# Patient Record
Sex: MALE | Employment: FULL TIME | URBAN - METROPOLITAN AREA
[De-identification: names, ages, dates, MRNs, and addresses within clinical notes are randomized per-mention and may not be internally consistent; named-entity substitution may affect disease eponyms.]

---

## 2021-01-09 ENCOUNTER — OFFICE VISIT (OUTPATIENT)
Dept: URGENT CARE | Facility: CLINIC | Age: 21
End: 2021-01-09
Payer: COMMERCIAL

## 2021-01-09 VITALS
WEIGHT: 215 LBS | OXYGEN SATURATION: 100 % | BODY MASS INDEX: 28.49 KG/M2 | TEMPERATURE: 98 F | RESPIRATION RATE: 16 BRPM | HEART RATE: 64 BPM | HEIGHT: 73 IN

## 2021-01-09 DIAGNOSIS — R68.89 FLU-LIKE SYMPTOMS: ICD-10-CM

## 2021-01-09 DIAGNOSIS — U07.1 COVID-19: Primary | ICD-10-CM

## 2021-01-09 PROCEDURE — 99204 OFFICE O/P NEW MOD 45 MIN: CPT | Performed by: FAMILY MEDICINE

## 2021-01-09 PROCEDURE — U0003 INFECTIOUS AGENT DETECTION BY NUCLEIC ACID (DNA OR RNA); SEVERE ACUTE RESPIRATORY SYNDROME CORONAVIRUS 2 (SARS-COV-2) (CORONAVIRUS DISEASE [COVID-19]), AMPLIFIED PROBE TECHNIQUE, MAKING USE OF HIGH THROUGHPUT TECHNOLOGIES AS DESCRIBED BY CMS-2020-01-R: HCPCS | Performed by: FAMILY MEDICINE

## 2021-01-09 PROCEDURE — U0005 INFEC AGEN DETEC AMPLI PROBE: HCPCS | Performed by: FAMILY MEDICINE

## 2021-01-09 NOTE — LETTER
January 9, 2021     Patient: Emory Bruce   YOB: 2000   Date of Visit: 1/9/2021       To Whom it May Concern:    Emory Bruce is under my professional care  He was seen in my office on 1/9/2021  He may return to work on Pending his COVID test results  If you have any questions or concerns, please don't hesitate to call           Sincerely,          Joanna Aguayo DO        CC: No Recipients

## 2021-01-09 NOTE — PROGRESS NOTES
Assessment/Plan:   Recommend continued supportive care fluids and rest   Continue self quarantine as per CDC guidelines  Follow-up here with family physician if symptoms worsen  To call with COVID results  Diagnoses and all orders for this visit:    COVID-19    Flu-like symptoms  -     Novel Coronavirus (COVID-19), PCR [facility] - Office Collection          Subjective:     Patient ID: Emory Bruce is a 23 y o  male  Patient presents with:  COVID-19: loss of taste and smell x 5 days           Review of Systems   Constitutional: Negative  HENT: Negative  Eyes: Negative  Respiratory: Negative  Cardiovascular: Negative  Gastrointestinal: Negative  Endocrine: Negative  Genitourinary: Negative  Musculoskeletal: Negative  Skin: Negative  Allergic/Immunologic: Negative  Neurological: Negative  Hematological: Negative  Psychiatric/Behavioral: Negative  All other systems reviewed and are negative  Objective:     Physical Exam  Vitals signs and nursing note reviewed  Constitutional:       Appearance: He is well-developed  HENT:      Head: Normocephalic and atraumatic  Right Ear: External ear normal       Left Ear: External ear normal       Nose: Nose normal    Eyes:      Conjunctiva/sclera: Conjunctivae normal       Pupils: Pupils are equal, round, and reactive to light  Neck:      Musculoskeletal: Normal range of motion and neck supple  Cardiovascular:      Rate and Rhythm: Normal rate and regular rhythm  Heart sounds: Normal heart sounds  Pulmonary:      Effort: Pulmonary effort is normal       Breath sounds: Normal breath sounds  Abdominal:      General: Bowel sounds are normal       Palpations: Abdomen is soft  Musculoskeletal: Normal range of motion  Skin:     General: Skin is warm and dry  Neurological:      Mental Status: He is alert and oriented to person, place, and time  Deep Tendon Reflexes: Reflexes are normal and symmetric  Psychiatric:         Behavior: Behavior normal

## 2021-01-11 LAB — SARS-COV-2 RNA SPEC QL NAA+PROBE: DETECTED

## 2021-01-11 NOTE — RESULT ENCOUNTER NOTE
I tried to call patient regarding his positive results  The number lead to message that said the patient was not available but did not leave an option for leaving a message or not the number to call